# Patient Record
Sex: FEMALE | Race: WHITE | URBAN - METROPOLITAN AREA
[De-identification: names, ages, dates, MRNs, and addresses within clinical notes are randomized per-mention and may not be internally consistent; named-entity substitution may affect disease eponyms.]

---

## 2019-04-28 ENCOUNTER — OFFICE VISIT (OUTPATIENT)
Dept: URGENT CARE | Facility: RETAIL CLINIC | Age: 54
End: 2019-04-28

## 2019-04-28 VITALS — DIASTOLIC BLOOD PRESSURE: 105 MMHG | SYSTOLIC BLOOD PRESSURE: 179 MMHG | HEART RATE: 90 BPM | TEMPERATURE: 98.7 F

## 2019-04-28 DIAGNOSIS — K04.7 DENTAL INFECTION: Primary | ICD-10-CM

## 2019-04-28 PROCEDURE — 99213 OFFICE O/P EST LOW 20 MIN: CPT | Performed by: PHYSICIAN ASSISTANT

## 2019-04-28 RX ORDER — CLINDAMYCIN HCL 300 MG
300 CAPSULE ORAL 4 TIMES DAILY
Qty: 40 CAPSULE | Refills: 0 | Status: SHIPPED | OUTPATIENT
Start: 2019-04-28 | End: 2019-05-08

## 2019-04-28 RX ORDER — LOSARTAN POTASSIUM AND HYDROCHLOROTHIAZIDE 25; 100 MG/1; MG/1
TABLET ORAL
COMMUNITY
Start: 2018-10-17

## 2019-04-28 RX ORDER — HYDROCODONE BITARTRATE AND ACETAMINOPHEN 5; 325 MG/1; MG/1
TABLET ORAL
Refills: 0 | COMMUNITY
Start: 2019-01-04

## 2019-04-28 RX ORDER — TRAZODONE HYDROCHLORIDE 50 MG/1
50-100 TABLET, FILM COATED ORAL
COMMUNITY
Start: 2017-07-28

## 2019-04-28 RX ORDER — AMLODIPINE BESYLATE 10 MG/1
10 TABLET ORAL
COMMUNITY
Start: 2018-10-17

## 2019-04-28 RX ORDER — GABAPENTIN 400 MG/1
CAPSULE ORAL
Refills: 0 | COMMUNITY
Start: 2019-01-04

## 2019-04-28 RX ORDER — METOPROLOL SUCCINATE 100 MG/1
100 TABLET, EXTENDED RELEASE ORAL
COMMUNITY
Start: 2018-10-17

## 2019-04-28 RX ORDER — HYDROCODONE BITARTRATE AND ACETAMINOPHEN 5; 325 MG/1; MG/1
1 TABLET ORAL
COMMUNITY
Start: 2018-10-10

## 2019-04-28 NOTE — PROGRESS NOTES
"Chief Complaint   Patient presents with     Dental Problem     began yesterday; lower Right tooth; lower Right side of face is swollen     SUBJECTIVE:  Radha Beaver is a 54 year old female presenting with a chief complaint of dental pain and swelling.    Onset of symptoms was 1 day ago. She had quite a bit of pain yesterday but it improved today, swelling and redness worsened. \"I haven't been taking by blood pressure meds for the last 3 months. It's usually 210/179 when I'm off my meds.\"    History reviewed. No pertinent past medical history.  Current Outpatient Medications   Medication Sig Dispense Refill     Acetaminophen (TYLENOL PO)        clindamycin (CLEOCIN) 300 MG capsule Take 1 capsule (300 mg) by mouth 4 times daily for 10 days 40 capsule 0     gabapentin (NEURONTIN) 400 MG capsule TAKE ONE CAPSULE BY MOUTH THREE TIMES A DAY  0     HYDROcodone-acetaminophen (NORCO) 5-325 MG tablet Take 1 tablet by mouth       amLODIPine (NORVASC) 10 MG tablet Take 10 mg by mouth       HYDROcodone-acetaminophen (NORCO) 5-325 MG tablet TAKE ONE TABLET BY MOUTH THREE TIMES A DAY AS NEEDED . *CAUTION: OPIOID. RISK OF OVERDOSE AND ADDICTION.  0     losartan-hydrochlorothiazide (HYZAAR) 100-25 MG tablet Take 1/2 tab 2 times daily       metoprolol succinate ER (TOPROL XL) 100 MG 24 hr tablet Take 100 mg by mouth       multivitamin, therapeutic with minerals (MULTI-VITAMIN) TABS Take 1 tablet by mouth daily       traZODone (DESYREL) 50 MG tablet Take  mg by mouth       Varenicline Tartrate (CHANTIX PO) Take 1 mg by mouth        VITAMIN D, CHOLECALCIFEROL, PO Take 5,000 Units by mouth daily       Social History     Tobacco Use     Smoking status: Current Every Day Smoker     Packs/day: 1.50   Substance Use Topics     Alcohol use: Not on file     Allergies   Allergen Reactions     Ibuprofen      Other reaction(s): Bleeding  GI Bleed, was told to not take anymore     Lisinopril Cough     Penicillin G Hives     REVIEW OF " SYSTEMS  General: NEGATIVE for fever.  ENT: POSITIVE for swelling in cheek, pain in tooth, poor dentition. NEGATIVE for sore throat.    OBJECTIVE:   BP (!) 179/105   Pulse 90   Temp 98.7  F (37.1  C) (Oral)   GENERAL APPEARANCE: healthy alert and in no distress. Smells strongly of cigarette smoke.  MOUTH: Oral mucosa generally pink and moist. Generally poor dentition. Erythematous and edematous gums near tooth number 30, which has a cap. Right cheek is swollen with a grape sized mass under the skin, over her mandible/masseter muscle. No discharge or drainage.  RESP: Lungs clear to ausculation. No rales rhonchi or wheezes.  CARDIO: Regular rate and rhythm, normal S1, S2, no murmur noted.    ASSESSMENT:    ICD-10-CM    1. Dental infection K04.7 clindamycin (CLEOCIN) 300 MG capsule     PLAN:   Patient Instructions   It is important that you make an appointment with a dentist.  Take antibiotic as directed- Clindamycin 4 times daily for 10 days.  Brush teeth twice daily  Salt water gargles- mix about 8 oz of water with about 1 tsp of salt. Swish and spit.  Take Tylenol or an NSAID such as ibuprofen or naproxen as needed for pain.  May take up to 1000 mg of Tylenol every 6-8 hours- do not exceed 4000 mg in 24 hours.  May take up to 600 mg ibuprofen every 6-8 hours.  Alternate Tylenol and Ibuprofen every 3-4 hours.  Please follow up with primary care provider if not improving, worsening or new symptoms or for any adverse reactions to medications.      Radha to follow up with Primary Care provider regarding elevated blood pressure.  Your blood pressure is elevated at today's visit.  Once you start feeling better, check your blood pressure again. This can be done at local pharmacies, grocery stores or with the float nurse at the [facility]. Record your readings and take them with you to your appointment.   Goal blood pressure 120/80.   If you have chest pain, unusual headaches, sudden vision changes, facial drooping,  arm weakness, slurred speech or trouble walking call 911 right away.    Follow up with primary care provider with any problems, questions or concerns or if symptoms worsen or fail to improve. Patient agreed to plan and verbalized understanding.    Sonja Garcia PA-C  [facility] [place]

## 2019-04-28 NOTE — PATIENT INSTRUCTIONS
It is important that you make an appointment with a dentist.  Take antibiotic as directed- Clindamycin 4 times daily for 10 days.  Brush teeth twice daily  Salt water gargles- mix about 8 oz of water with about 1 tsp of salt. Swish and spit.  Take Tylenol or an NSAID such as ibuprofen or naproxen as needed for pain.  May take up to 1000 mg of Tylenol every 6-8 hours- do not exceed 4000 mg in 24 hours.  May take up to 600 mg ibuprofen every 6-8 hours.  Alternate Tylenol and Ibuprofen every 3-4 hours.  Please follow up with primary care provider if not improving, worsening or new symptoms or for any adverse reactions to medications.      Radha to follow up with Primary Care provider regarding elevated blood pressure.  Your blood pressure is elevated at today's visit.  Once you start feeling better, check your blood pressure again. This can be done at local pharmacies, grocery stores or with the float nurse at the [facility]. Record your readings and take them with you to your appointment.   Goal blood pressure 120/80.   If you have chest pain, unusual headaches, sudden vision changes, facial drooping, arm weakness, slurred speech or trouble walking call 911 right away.